# Patient Record
Sex: FEMALE | Race: OTHER | HISPANIC OR LATINO
[De-identification: names, ages, dates, MRNs, and addresses within clinical notes are randomized per-mention and may not be internally consistent; named-entity substitution may affect disease eponyms.]

---

## 2024-08-19 ENCOUNTER — NON-APPOINTMENT (OUTPATIENT)
Age: 29
End: 2024-08-19

## 2024-09-03 PROBLEM — Z00.00 ENCOUNTER FOR PREVENTIVE HEALTH EXAMINATION: Status: ACTIVE | Noted: 2024-09-03

## 2024-09-04 ENCOUNTER — APPOINTMENT (OUTPATIENT)
Dept: ANTEPARTUM | Facility: CLINIC | Age: 29
End: 2024-09-04
Payer: MEDICAID

## 2024-09-04 ENCOUNTER — ASOB RESULT (OUTPATIENT)
Age: 29
End: 2024-09-04

## 2024-09-04 PROCEDURE — 76813 OB US NUCHAL MEAS 1 GEST: CPT | Mod: 59

## 2024-09-04 PROCEDURE — 76817 TRANSVAGINAL US OBSTETRIC: CPT

## 2024-09-04 PROCEDURE — 76801 OB US < 14 WKS SINGLE FETUS: CPT

## 2024-09-18 ENCOUNTER — TRANSCRIPTION ENCOUNTER (OUTPATIENT)
Age: 29
End: 2024-09-18

## 2024-10-18 ENCOUNTER — ASOB RESULT (OUTPATIENT)
Age: 29
End: 2024-10-18

## 2024-10-18 ENCOUNTER — APPOINTMENT (OUTPATIENT)
Dept: ANTEPARTUM | Facility: CLINIC | Age: 29
End: 2024-10-18

## 2024-10-18 PROCEDURE — 76811 OB US DETAILED SNGL FETUS: CPT

## 2024-10-28 ENCOUNTER — ASOB RESULT (OUTPATIENT)
Age: 29
End: 2024-10-28

## 2024-10-28 ENCOUNTER — APPOINTMENT (OUTPATIENT)
Dept: ANTEPARTUM | Facility: CLINIC | Age: 29
End: 2024-10-28
Payer: MEDICAID

## 2024-10-28 PROCEDURE — 76816 OB US FOLLOW-UP PER FETUS: CPT

## 2024-12-16 ENCOUNTER — APPOINTMENT (OUTPATIENT)
Dept: ANTEPARTUM | Facility: CLINIC | Age: 29
End: 2024-12-16
Payer: MEDICAID

## 2024-12-16 ENCOUNTER — ASOB RESULT (OUTPATIENT)
Age: 29
End: 2024-12-16

## 2024-12-16 PROCEDURE — 76819 FETAL BIOPHYS PROFIL W/O NST: CPT | Mod: 59

## 2024-12-16 PROCEDURE — 76816 OB US FOLLOW-UP PER FETUS: CPT

## 2025-01-13 ENCOUNTER — ASOB RESULT (OUTPATIENT)
Age: 30
End: 2025-01-13

## 2025-01-13 ENCOUNTER — APPOINTMENT (OUTPATIENT)
Dept: ANTEPARTUM | Facility: CLINIC | Age: 30
End: 2025-01-13
Payer: MEDICAID

## 2025-01-13 PROCEDURE — 76816 OB US FOLLOW-UP PER FETUS: CPT

## 2025-02-14 ENCOUNTER — APPOINTMENT (OUTPATIENT)
Dept: ANTEPARTUM | Facility: CLINIC | Age: 30
End: 2025-02-14
Payer: MEDICAID

## 2025-02-14 ENCOUNTER — ASOB RESULT (OUTPATIENT)
Age: 30
End: 2025-02-14

## 2025-02-14 PROCEDURE — 76819 FETAL BIOPHYS PROFIL W/O NST: CPT | Mod: 59

## 2025-02-14 PROCEDURE — 76816 OB US FOLLOW-UP PER FETUS: CPT

## 2025-03-01 ENCOUNTER — RESULT REVIEW (OUTPATIENT)
Age: 30
End: 2025-03-01

## 2025-03-01 ENCOUNTER — OUTPATIENT (OUTPATIENT)
Dept: OUTPATIENT SERVICES | Facility: HOSPITAL | Age: 30
LOS: 1 days | End: 2025-03-01
Payer: MEDICAID

## 2025-03-01 VITALS
RESPIRATION RATE: 18 BRPM | DIASTOLIC BLOOD PRESSURE: 63 MMHG | OXYGEN SATURATION: 97 % | HEART RATE: 61 BPM | SYSTOLIC BLOOD PRESSURE: 97 MMHG | TEMPERATURE: 98 F

## 2025-03-01 DIAGNOSIS — Z98.890 OTHER SPECIFIED POSTPROCEDURAL STATES: Chronic | ICD-10-CM

## 2025-03-01 DIAGNOSIS — O26.899 OTHER SPECIFIED PREGNANCY RELATED CONDITIONS, UNSPECIFIED TRIMESTER: ICD-10-CM

## 2025-03-01 PROCEDURE — 76818 FETAL BIOPHYS PROFILE W/NST: CPT

## 2025-03-01 PROCEDURE — 99221 1ST HOSP IP/OBS SF/LOW 40: CPT

## 2025-03-01 PROCEDURE — 59025 FETAL NON-STRESS TEST: CPT

## 2025-03-01 PROCEDURE — 76818 FETAL BIOPHYS PROFILE W/NST: CPT | Mod: 26

## 2025-03-01 PROCEDURE — G0463: CPT

## 2025-03-01 NOTE — OB PROVIDER TRIAGE NOTE - HISTORY OF PRESENT ILLNESS
Patient is a 29 year old  at 39w2d who presents to triage with referral from Dr Salazar for cat II tracing and rule out oligo.  Denies vaginal bleeding, leakage of fluid, decreased fetal movement, or any other concerns.   PNC with Dr Salazar  PMHx: Hypothyroidism and Vit D deficiency  Sxhx: D&C x 1  Meds: PNV, medication for hypothyroidism (unknown at this time)  Allergies: NKDA  OBHx:  Preg 2012- First trimester eTOP with D&C  GynHx: Normal menses, one partner, no stds, no cysts, no fibroids, normal paps  SocialHx; neg x 3, no anxiety or depression

## 2025-03-01 NOTE — OB PROVIDER TRIAGE NOTE - ADDITIONAL INSTRUCTIONS
Discharge home with strict precautions  Report back to triage with vaginal bleeding, leakage of fluid, decreased fetal movement, abdominal or pelvic pain, or any other concerns  [place] reviewed  Follow up in office as scheduled on Tuesday  Increase oral hydration

## 2025-03-01 NOTE — OB PROVIDER TRIAGE NOTE - NSOBPROVIDERNOTE_OBGYN_ALL_OB_FT
29 year old  at 39w2d for prolonged monitoring. BPP 8 29 year old  at 39w2d for prolonged monitoring. BPP .  Reassuring fetal status.

## 2025-03-01 NOTE — OB RN TRIAGE NOTE - NSNURSINGINSTR_OBGYN_ALL_OB_FT
Pt discharged home per MAKAYLA Francis. Pt given discharge instructions/education. Pt given labor precautions. Pt instructed to return back to triage if experiencing vaginal bleeding, leakage of fluid, decreased fetal movement, abdominal/ pelvic pain or any other concerns. Pt educated on fetal kick counts and baby movement. Pt is to follow up in office as scheduled on Tuesday. Pt discharged home per MAKAYLA Francis. Pt given discharge instructions/education. Pt given labor precautions. Pt instructed to return back to triage if experiencing vaginal bleeding, leakage of fluid, decreased fetal movement, abdominal/ pelvic pain or any other concerns. Pt educated on fetal kick counts and baby movement. Pt is to follow up in office as scheduled on Tuesday. Pt verbalized understanding of discharge instructions/education.

## 2025-03-02 DIAGNOSIS — Z3A.39 39 WEEKS GESTATION OF PREGNANCY: ICD-10-CM

## 2025-03-02 DIAGNOSIS — E03.9 HYPOTHYROIDISM, UNSPECIFIED: ICD-10-CM

## 2025-03-02 DIAGNOSIS — O36.8330 MATERNAL CARE FOR ABNORMALITIES OF THE FETAL HEART RATE OR RHYTHM, THIRD TRIMESTER, NOT APPLICABLE OR UNSPECIFIED: ICD-10-CM

## 2025-03-02 DIAGNOSIS — O99.283 ENDOCRINE, NUTRITIONAL AND METABOLIC DISEASES COMPLICATING PREGNANCY, THIRD TRIMESTER: ICD-10-CM

## 2025-03-03 ENCOUNTER — OUTPATIENT (OUTPATIENT)
Dept: OUTPATIENT SERVICES | Facility: HOSPITAL | Age: 30
LOS: 1 days | End: 2025-03-03
Payer: MEDICAID

## 2025-03-03 DIAGNOSIS — O26.899 OTHER SPECIFIED PREGNANCY RELATED CONDITIONS, UNSPECIFIED TRIMESTER: ICD-10-CM

## 2025-03-03 DIAGNOSIS — Z98.890 OTHER SPECIFIED POSTPROCEDURAL STATES: Chronic | ICD-10-CM

## 2025-03-03 PROCEDURE — 99213 OFFICE O/P EST LOW 20 MIN: CPT | Mod: 25

## 2025-03-03 PROCEDURE — 59025 FETAL NON-STRESS TEST: CPT | Mod: 26

## 2025-03-03 PROCEDURE — G0463: CPT

## 2025-03-03 PROCEDURE — 59025 FETAL NON-STRESS TEST: CPT

## 2025-03-04 VITALS
RESPIRATION RATE: 15 BRPM | HEART RATE: 74 BPM | OXYGEN SATURATION: 99 % | SYSTOLIC BLOOD PRESSURE: 108 MMHG | DIASTOLIC BLOOD PRESSURE: 66 MMHG | TEMPERATURE: 99 F

## 2025-03-04 NOTE — OB RN TRIAGE NOTE - SUICIDE SCREENING QUESTION 1
----- Message from Sheri Monteiro DO sent at 12/13/2017 11:11 AM CST -----  Please inform patient INR now too low at 1.7.  Recommend increasing Coumadin 2.5 mg 6 days a week. Only hold one day per week instead of 2.  Please order home ACL draw for 1 week.  
ACL Lab: [phone]  Writer spoke to Emilia staff from [facility] and inform that home draw repeat in 1 week.  Emilia is aware and in agreement   Pt is scheduled on 12/20/2017   
Informed patient regarding test results and Dr. Monteiro plan of care.   Patient verbalizes understanding and has no further questions at this time.  Patient in agreement.  Order placed for home draw     
No

## 2025-03-04 NOTE — OB PROVIDER TRIAGE NOTE - ADDITIONAL INSTRUCTIONS
continue prenatal vitamins  if water breaks contractions noted vaginal bleeding noted return to delivery room  daily check the baby movements with fetal kick count  no sex nothing in vagina no heavy lifting no strenuous activities  modified activities: walk as tolerated to prevent clot formation  water hydration 2-3liters a day; avoid caffeine beverages: which make you palpitate and dehydrate  See Dr. Salazar in office today as scheduled  Return to labor room with any new or worsening symptoms

## 2025-03-04 NOTE — OB RN TRIAGE NOTE - PAIN SCALE PREFERRED, PROFILE
Your medical care was provided today by: JULIANA Pierre    Thank You for the opportunity to serve you.    You may receive a brief survey in the mail shortly regarding your visit today. Please take a few moments to complete the survey and return it; no postage is necessary. We are working to serve our patient population better, improve customer service and our patients overall experience and your input can help us to accomplish this. We thank you for your help and for the opportunity to serve you today and in the future.     Special Instructions:  Always call 9-1-1 immediately if you develop a life threatening emergency.    Unless told otherwise please take all medications as directed and complete prescription therapies.     Watch for the following signs that require additional evaluation: progressive lethargy or unresponsiveness, localized pain (chest, abdomen), shortness of breath, painful breathing, progressive vomiting with weakness, bloody stools, or new rash.     If you are prescribed pain medication or any other medication that is sedating, do not take medication before or while operating a vehicle or heavy machinery or equipment due to potential side effects such as drowsiness and/or dizziness.    numerical 0-10

## 2025-03-04 NOTE — OB PROVIDER TRIAGE NOTE - NSOBPROVIDERNOTE_OBGYN_ALL_OB_FT
30yo  pt at 39w5d c/o pelvic pressure  - unable to reach Dr. Salazar, reviewed with Dr. Paul burnett attending  - continue close fetal monitoring 28yo  pt at 39w5d c/o pelvic pressure  - unable to reach Dr. Salazar, reviewed with Dr. Miller house attending  - NST cat I, baseline 130, moderate variability, accels present, no decels  - pt stable for discharge home, has an appointment with Dr. Salazar today at 1130am  - Discussed with Dr. Miller

## 2025-03-04 NOTE — OB PROVIDER TRIAGE NOTE - HISTORY OF PRESENT ILLNESS
28yo  pt at 39w5d (LMP 24) presents c/o pelvic pressure  Pt reports she had a BPP 3 days ago and was told that if she felt any difference to come back to triage.  Pt denies ctx pain, VB, LOF  no fever/chills/headache/n/v/c/d or any other complaints at this time    prenatalhx: no problems with current pregnancy, pt of dr. beebe  obhx: , TOP with D&C 2012  gynhx: denies hx cysts, fibroids, or abnormal pap smears    medhx: denies  meds: PNVs  surghx: D&C 2012  allerg: nkda  sochx: denies cigarette, alcohol, or drug use  psychhx: denies hx depression or anxiety

## 2025-03-04 NOTE — OB PROVIDER TRIAGE NOTE - NSHPPHYSICALEXAM_GEN_ALL_CORE
Vital Signs Last 24 Hrs  T(C): 37 (04 Mar 2025 01:08), Max: 37 (04 Mar 2025 01:08)  T(F): 98.6 (04 Mar 2025 01:08), Max: 98.6 (04 Mar 2025 01:08)  HR: 74 (04 Mar 2025 01:08) (74 - 74)  BP: 108/66 (04 Mar 2025 01:08) (108/66 - 108/66)  RR: 15 (04 Mar 2025 01:08) (15 - 15)  SpO2: 99% (04 Mar 2025 01:08) (99% - 99%)    Parameters below as of 04 Mar 2025 01:08  Patient On (Oxygen Delivery Method): room air    fetal tracing cat I  baseline 130  moderate variability  accels present  no decels    physical exam:  gen: A&O x3, NAD  abd: gravid, soft, non tender, no rigidity or guarding  extrem: soft, non tender, no swelling/varicosities/or color changes noted  gyn: cervix C/L/P no VB or LOF noted

## 2025-03-05 ENCOUNTER — INPATIENT (INPATIENT)
Facility: HOSPITAL | Age: 30
LOS: 2 days | Discharge: HOME | DRG: 951 | End: 2025-03-08
Attending: OBSTETRICS & GYNECOLOGY | Admitting: OBSTETRICS & GYNECOLOGY
Payer: MEDICAID

## 2025-03-05 VITALS
HEART RATE: 81 BPM | TEMPERATURE: 98 F | SYSTOLIC BLOOD PRESSURE: 120 MMHG | RESPIRATION RATE: 18 BRPM | OXYGEN SATURATION: 99 % | DIASTOLIC BLOOD PRESSURE: 78 MMHG

## 2025-03-05 DIAGNOSIS — Z98.890 OTHER SPECIFIED POSTPROCEDURAL STATES: Chronic | ICD-10-CM

## 2025-03-05 DIAGNOSIS — Z34.80 ENCOUNTER FOR SUPERVISION OF OTHER NORMAL PREGNANCY, UNSPECIFIED TRIMESTER: ICD-10-CM

## 2025-03-05 DIAGNOSIS — Z3A.39 39 WEEKS GESTATION OF PREGNANCY: ICD-10-CM

## 2025-03-05 DIAGNOSIS — O26.899 OTHER SPECIFIED PREGNANCY RELATED CONDITIONS, UNSPECIFIED TRIMESTER: ICD-10-CM

## 2025-03-05 PROBLEM — E03.9 HYPOTHYROIDISM, UNSPECIFIED: Chronic | Status: ACTIVE | Noted: 2025-03-01

## 2025-03-05 LAB
APTT BLD: 27.7 SEC — SIGNIFICANT CHANGE UP (ref 24.5–35.6)
BASOPHILS # BLD AUTO: 0.03 K/UL — SIGNIFICANT CHANGE UP (ref 0–0.2)
BASOPHILS NFR BLD AUTO: 0.3 % — SIGNIFICANT CHANGE UP (ref 0–2)
EOSINOPHIL # BLD AUTO: 0.05 K/UL — SIGNIFICANT CHANGE UP (ref 0–0.5)
EOSINOPHIL NFR BLD AUTO: 0.5 % — SIGNIFICANT CHANGE UP (ref 0–6)
FIBRINOGEN PPP-MCNC: 524 MG/DL — HIGH (ref 200–475)
HCT VFR BLD CALC: 38 % — SIGNIFICANT CHANGE UP (ref 34.5–45)
HGB BLD-MCNC: 12.3 G/DL — SIGNIFICANT CHANGE UP (ref 11.5–15.5)
IMM GRANULOCYTES NFR BLD AUTO: 0.3 % — SIGNIFICANT CHANGE UP (ref 0–0.9)
INR BLD: 0.85 RATIO — SIGNIFICANT CHANGE UP (ref 0.85–1.16)
LYMPHOCYTES # BLD AUTO: 2.11 K/UL — SIGNIFICANT CHANGE UP (ref 1–3.3)
LYMPHOCYTES # BLD AUTO: 20.6 % — SIGNIFICANT CHANGE UP (ref 13–44)
MCHC RBC-ENTMCNC: 28.5 PG — SIGNIFICANT CHANGE UP (ref 27–34)
MCHC RBC-ENTMCNC: 32.4 G/DL — SIGNIFICANT CHANGE UP (ref 32–36)
MCV RBC AUTO: 88.2 FL — SIGNIFICANT CHANGE UP (ref 80–100)
MONOCYTES # BLD AUTO: 0.49 K/UL — SIGNIFICANT CHANGE UP (ref 0–0.9)
MONOCYTES NFR BLD AUTO: 4.8 % — SIGNIFICANT CHANGE UP (ref 2–14)
NEUTROPHILS # BLD AUTO: 7.53 K/UL — HIGH (ref 1.8–7.4)
NEUTROPHILS NFR BLD AUTO: 73.5 % — SIGNIFICANT CHANGE UP (ref 43–77)
NRBC BLD AUTO-RTO: 0 /100 WBCS — SIGNIFICANT CHANGE UP (ref 0–0)
PLATELET # BLD AUTO: 355 K/UL — SIGNIFICANT CHANGE UP (ref 150–400)
PROTHROM AB SERPL-ACNC: 9.9 SEC — SIGNIFICANT CHANGE UP (ref 9.9–13.4)
RBC # BLD: 4.31 M/UL — SIGNIFICANT CHANGE UP (ref 3.8–5.2)
RBC # FLD: 13.5 % — SIGNIFICANT CHANGE UP (ref 10.3–14.5)
WBC # BLD: 10.24 K/UL — SIGNIFICANT CHANGE UP (ref 3.8–10.5)
WBC # FLD AUTO: 10.24 K/UL — SIGNIFICANT CHANGE UP (ref 3.8–10.5)

## 2025-03-05 RX ORDER — OXYTOCIN-SODIUM CHLORIDE 0.9% IV SOLN 30 UNIT/500ML 30-0.9/5 UT/ML-%
SOLUTION INTRAVENOUS
Qty: 30 | Refills: 0 | Status: DISCONTINUED | OUTPATIENT
Start: 2025-03-05 | End: 2025-03-06

## 2025-03-05 RX ORDER — SODIUM CHLORIDE 9 G/1000ML
1000 INJECTION, SOLUTION INTRAVENOUS
Refills: 0 | Status: DISCONTINUED | OUTPATIENT
Start: 2025-03-05 | End: 2025-03-06

## 2025-03-05 RX ORDER — OXYTOCIN-SODIUM CHLORIDE 0.9% IV SOLN 30 UNIT/500ML 30-0.9/5 UT/ML-%
167 SOLUTION INTRAVENOUS
Qty: 30 | Refills: 0 | Status: DISCONTINUED | OUTPATIENT
Start: 2025-03-05 | End: 2025-03-06

## 2025-03-05 RX ADMIN — OXYTOCIN-SODIUM CHLORIDE 0.9% IV SOLN 30 UNIT/500ML 2 MILLIUNIT(S)/MIN: 30-0.9/5 SOLUTION at 23:53

## 2025-03-06 ENCOUNTER — APPOINTMENT (OUTPATIENT)
Dept: ANTEPARTUM | Facility: CLINIC | Age: 30
End: 2025-03-06

## 2025-03-06 LAB
ABO RH CONFIRMATION: SIGNIFICANT CHANGE UP
T PALLIDUM AB TITR SER: NEGATIVE — SIGNIFICANT CHANGE UP

## 2025-03-06 RX ORDER — DIPHENHYDRAMINE HCL 12.5MG/5ML
25 ELIXIR ORAL EVERY 6 HOURS
Refills: 0 | Status: DISCONTINUED | OUTPATIENT
Start: 2025-03-06 | End: 2025-03-08

## 2025-03-06 RX ORDER — WITCH HAZEL LEAF
1 FLUID EXTRACT MISCELLANEOUS EVERY 4 HOURS
Refills: 0 | Status: DISCONTINUED | OUTPATIENT
Start: 2025-03-06 | End: 2025-03-08

## 2025-03-06 RX ORDER — BENZOCAINE 220 MG/G
1 SPRAY, METERED PERIODONTAL EVERY 6 HOURS
Refills: 0 | Status: DISCONTINUED | OUTPATIENT
Start: 2025-03-06 | End: 2025-03-08

## 2025-03-06 RX ORDER — MAGNESIUM HYDROXIDE 400 MG/5ML
30 SUSPENSION ORAL
Refills: 0 | Status: DISCONTINUED | OUTPATIENT
Start: 2025-03-06 | End: 2025-03-08

## 2025-03-06 RX ORDER — OXYCODONE HYDROCHLORIDE 30 MG/1
5 TABLET ORAL ONCE
Refills: 0 | Status: DISCONTINUED | OUTPATIENT
Start: 2025-03-06 | End: 2025-03-08

## 2025-03-06 RX ORDER — CITRIC ACID/SODIUM CITRATE 300-500 MG
30 SOLUTION, ORAL ORAL ONCE
Refills: 0 | Status: DISCONTINUED | OUTPATIENT
Start: 2025-03-06 | End: 2025-03-06

## 2025-03-06 RX ORDER — PRENATAL 136/IRON/FOLIC ACID 27 MG-1 MG
1 TABLET ORAL DAILY
Refills: 0 | Status: DISCONTINUED | OUTPATIENT
Start: 2025-03-06 | End: 2025-03-08

## 2025-03-06 RX ORDER — HYDROCORTISONE 10 MG/G
1 CREAM TOPICAL EVERY 6 HOURS
Refills: 0 | Status: DISCONTINUED | OUTPATIENT
Start: 2025-03-06 | End: 2025-03-08

## 2025-03-06 RX ORDER — KETOROLAC TROMETHAMINE 30 MG/ML
30 INJECTION, SOLUTION INTRAMUSCULAR; INTRAVENOUS ONCE
Refills: 0 | Status: DISCONTINUED | OUTPATIENT
Start: 2025-03-06 | End: 2025-03-06

## 2025-03-06 RX ORDER — MODIFIED LANOLIN 100 %
1 CREAM (GRAM) TOPICAL EVERY 6 HOURS
Refills: 0 | Status: DISCONTINUED | OUTPATIENT
Start: 2025-03-06 | End: 2025-03-08

## 2025-03-06 RX ORDER — SIMETHICONE 80 MG
80 TABLET,CHEWABLE ORAL EVERY 4 HOURS
Refills: 0 | Status: DISCONTINUED | OUTPATIENT
Start: 2025-03-06 | End: 2025-03-08

## 2025-03-06 RX ORDER — DIBUCAINE 10 MG/G
1 OINTMENT TOPICAL EVERY 6 HOURS
Refills: 0 | Status: DISCONTINUED | OUTPATIENT
Start: 2025-03-06 | End: 2025-03-08

## 2025-03-06 RX ORDER — LEVOTHYROXINE SODIUM 300 MCG
75 TABLET ORAL DAILY
Refills: 0 | Status: DISCONTINUED | OUTPATIENT
Start: 2025-03-06 | End: 2025-03-08

## 2025-03-06 RX ORDER — IBUPROFEN 200 MG
600 TABLET ORAL EVERY 6 HOURS
Refills: 0 | Status: COMPLETED | OUTPATIENT
Start: 2025-03-06 | End: 2026-02-02

## 2025-03-06 RX ORDER — ACETAMINOPHEN 500 MG/5ML
975 LIQUID (ML) ORAL
Refills: 0 | Status: DISCONTINUED | OUTPATIENT
Start: 2025-03-06 | End: 2025-03-08

## 2025-03-06 RX ORDER — AZITHROMYCIN 250 MG
500 CAPSULE ORAL ONCE
Refills: 0 | Status: DISCONTINUED | OUTPATIENT
Start: 2025-03-06 | End: 2025-03-06

## 2025-03-06 RX ORDER — PRAMOXINE HCL 1 %
1 GEL (GRAM) TOPICAL EVERY 4 HOURS
Refills: 0 | Status: DISCONTINUED | OUTPATIENT
Start: 2025-03-06 | End: 2025-03-08

## 2025-03-06 RX ORDER — OXYTOCIN-SODIUM CHLORIDE 0.9% IV SOLN 30 UNIT/500ML 30-0.9/5 UT/ML-%
167 SOLUTION INTRAVENOUS
Qty: 30 | Refills: 0 | Status: DISCONTINUED | OUTPATIENT
Start: 2025-03-06 | End: 2025-03-08

## 2025-03-06 RX ORDER — CEFAZOLIN SODIUM IN 0.9 % NACL 3 G/100 ML
2000 INTRAVENOUS SOLUTION, PIGGYBACK (ML) INTRAVENOUS ONCE
Refills: 0 | Status: DISCONTINUED | OUTPATIENT
Start: 2025-03-06 | End: 2025-03-06

## 2025-03-06 RX ORDER — OXYCODONE HYDROCHLORIDE 30 MG/1
5 TABLET ORAL
Refills: 0 | Status: DISCONTINUED | OUTPATIENT
Start: 2025-03-06 | End: 2025-03-08

## 2025-03-06 RX ADMIN — OXYTOCIN-SODIUM CHLORIDE 0.9% IV SOLN 30 UNIT/500ML 2 MILLIUNIT(S)/MIN: 30-0.9/5 SOLUTION at 14:50

## 2025-03-06 RX ADMIN — KETOROLAC TROMETHAMINE 30 MILLIGRAM(S): 30 INJECTION, SOLUTION INTRAMUSCULAR; INTRAVENOUS at 21:03

## 2025-03-06 RX ADMIN — Medication 75 MICROGRAM(S): at 07:21

## 2025-03-06 RX ADMIN — KETOROLAC TROMETHAMINE 30 MILLIGRAM(S): 30 INJECTION, SOLUTION INTRAMUSCULAR; INTRAVENOUS at 21:31

## 2025-03-06 RX ADMIN — SODIUM CHLORIDE 125 MILLILITER(S): 9 INJECTION, SOLUTION INTRAVENOUS at 14:50

## 2025-03-06 RX ADMIN — Medication 975 MILLIGRAM(S): at 23:45

## 2025-03-06 RX ADMIN — Medication 3 MILLILITER(S): at 23:00

## 2025-03-06 RX ADMIN — Medication 975 MILLIGRAM(S): at 22:43

## 2025-03-06 RX ADMIN — OXYTOCIN-SODIUM CHLORIDE 0.9% IV SOLN 30 UNIT/500ML 2 MILLIUNIT(S)/MIN: 30-0.9/5 SOLUTION at 06:45

## 2025-03-07 DIAGNOSIS — R33.9 RETENTION OF URINE, UNSPECIFIED: ICD-10-CM

## 2025-03-07 DIAGNOSIS — D62 ACUTE POSTHEMORRHAGIC ANEMIA: ICD-10-CM

## 2025-03-07 LAB
BASOPHILS # BLD AUTO: 0.05 K/UL — SIGNIFICANT CHANGE UP (ref 0–0.2)
BASOPHILS NFR BLD AUTO: 0.3 % — SIGNIFICANT CHANGE UP (ref 0–2)
EOSINOPHIL # BLD AUTO: 0.06 K/UL — SIGNIFICANT CHANGE UP (ref 0–0.5)
EOSINOPHIL NFR BLD AUTO: 0.4 % — SIGNIFICANT CHANGE UP (ref 0–6)
HCT VFR BLD CALC: 25.1 % — LOW (ref 34.5–45)
HGB BLD-MCNC: 8.4 G/DL — LOW (ref 11.5–15.5)
IMM GRANULOCYTES NFR BLD AUTO: 0.6 % — SIGNIFICANT CHANGE UP (ref 0–0.9)
LYMPHOCYTES # BLD AUTO: 13.4 % — SIGNIFICANT CHANGE UP (ref 13–44)
LYMPHOCYTES # BLD AUTO: 2.19 K/UL — SIGNIFICANT CHANGE UP (ref 1–3.3)
MCHC RBC-ENTMCNC: 29.7 PG — SIGNIFICANT CHANGE UP (ref 27–34)
MCHC RBC-ENTMCNC: 33.5 G/DL — SIGNIFICANT CHANGE UP (ref 32–36)
MCV RBC AUTO: 88.7 FL — SIGNIFICANT CHANGE UP (ref 80–100)
MONOCYTES # BLD AUTO: 0.82 K/UL — SIGNIFICANT CHANGE UP (ref 0–0.9)
MONOCYTES NFR BLD AUTO: 5 % — SIGNIFICANT CHANGE UP (ref 2–14)
NEUTROPHILS # BLD AUTO: 13.13 K/UL — HIGH (ref 1.8–7.4)
NEUTROPHILS NFR BLD AUTO: 80.3 % — HIGH (ref 43–77)
NRBC BLD AUTO-RTO: 0 /100 WBCS — SIGNIFICANT CHANGE UP (ref 0–0)
PLATELET # BLD AUTO: 264 K/UL — SIGNIFICANT CHANGE UP (ref 150–400)
RBC # BLD: 2.83 M/UL — LOW (ref 3.8–5.2)
RBC # FLD: 13.7 % — SIGNIFICANT CHANGE UP (ref 10.3–14.5)
WBC # BLD: 16.35 K/UL — HIGH (ref 3.8–10.5)
WBC # FLD AUTO: 16.35 K/UL — HIGH (ref 3.8–10.5)

## 2025-03-07 RX ORDER — ONDANSETRON HCL/PF 4 MG/2 ML
4 VIAL (ML) INJECTION ONCE
Refills: 0 | Status: COMPLETED | OUTPATIENT
Start: 2025-03-07 | End: 2025-03-07

## 2025-03-07 RX ORDER — SODIUM CHLORIDE 9 G/1000ML
500 INJECTION, SOLUTION INTRAVENOUS ONCE
Refills: 0 | Status: COMPLETED | OUTPATIENT
Start: 2025-03-07 | End: 2025-03-07

## 2025-03-07 RX ADMIN — Medication 3 MILLILITER(S): at 06:51

## 2025-03-07 RX ADMIN — Medication 975 MILLIGRAM(S): at 06:11

## 2025-03-07 RX ADMIN — Medication 3 MILLILITER(S): at 14:15

## 2025-03-07 RX ADMIN — Medication 975 MILLIGRAM(S): at 22:20

## 2025-03-07 RX ADMIN — SODIUM CHLORIDE 1000 MILLILITER(S): 9 INJECTION, SOLUTION INTRAVENOUS at 04:30

## 2025-03-07 RX ADMIN — Medication 975 MILLIGRAM(S): at 07:10

## 2025-03-07 RX ADMIN — Medication 3 MILLILITER(S): at 22:19

## 2025-03-07 RX ADMIN — Medication 4 MILLIGRAM(S): at 08:23

## 2025-03-07 RX ADMIN — Medication 75 MICROGRAM(S): at 06:27

## 2025-03-07 RX ADMIN — Medication 1 TABLET(S): at 12:36

## 2025-03-07 RX ADMIN — Medication 975 MILLIGRAM(S): at 23:20

## 2025-03-08 ENCOUNTER — TRANSCRIPTION ENCOUNTER (OUTPATIENT)
Age: 30
End: 2025-03-08

## 2025-03-08 VITALS
SYSTOLIC BLOOD PRESSURE: 108 MMHG | OXYGEN SATURATION: 99 % | DIASTOLIC BLOOD PRESSURE: 58 MMHG | RESPIRATION RATE: 17 BRPM | HEART RATE: 58 BPM | TEMPERATURE: 98 F

## 2025-03-08 DIAGNOSIS — E03.9 HYPOTHYROIDISM, UNSPECIFIED: ICD-10-CM

## 2025-03-08 PROCEDURE — 85025 COMPLETE CBC W/AUTO DIFF WBC: CPT

## 2025-03-08 PROCEDURE — 59050 FETAL MONITOR W/REPORT: CPT

## 2025-03-08 PROCEDURE — 85384 FIBRINOGEN ACTIVITY: CPT

## 2025-03-08 PROCEDURE — 85610 PROTHROMBIN TIME: CPT

## 2025-03-08 PROCEDURE — 86780 TREPONEMA PALLIDUM: CPT

## 2025-03-08 PROCEDURE — 86923 COMPATIBILITY TEST ELECTRIC: CPT

## 2025-03-08 PROCEDURE — 36415 COLL VENOUS BLD VENIPUNCTURE: CPT

## 2025-03-08 PROCEDURE — 86850 RBC ANTIBODY SCREEN: CPT

## 2025-03-08 PROCEDURE — 86901 BLOOD TYPING SEROLOGIC RH(D): CPT

## 2025-03-08 PROCEDURE — 85730 THROMBOPLASTIN TIME PARTIAL: CPT

## 2025-03-08 PROCEDURE — 86900 BLOOD TYPING SEROLOGIC ABO: CPT

## 2025-03-08 RX ORDER — DOCUSATE SODIUM 100 MG
1 CAPSULE ORAL
Qty: 60 | Refills: 0
Start: 2025-03-08 | End: 2025-04-06

## 2025-03-08 RX ORDER — ACETAMINOPHEN 500 MG/5ML
2 LIQUID (ML) ORAL
Qty: 30 | Refills: 0
Start: 2025-03-08 | End: 2025-03-12

## 2025-03-08 RX ORDER — SIMETHICONE 80 MG
1 TABLET,CHEWABLE ORAL
Qty: 60 | Refills: 1
Start: 2025-03-08 | End: 2025-05-06

## 2025-03-08 RX ORDER — FERROUS SULFATE 137(45) MG
1 TABLET, EXTENDED RELEASE ORAL
Qty: 30 | Refills: 1
Start: 2025-03-08 | End: 2025-05-06

## 2025-03-08 RX ORDER — IBUPROFEN 200 MG
1 TABLET ORAL
Qty: 20 | Refills: 0
Start: 2025-03-08 | End: 2025-03-12

## 2025-03-08 RX ORDER — PRENATAL 136/IRON/FOLIC ACID 27 MG-1 MG
1 TABLET ORAL
Qty: 30 | Refills: 5
Start: 2025-03-08 | End: 2025-09-03

## 2025-03-08 RX ORDER — IBUPROFEN 200 MG
600 TABLET ORAL EVERY 6 HOURS
Refills: 0 | Status: DISCONTINUED | OUTPATIENT
Start: 2025-03-08 | End: 2025-03-08

## 2025-03-08 RX ADMIN — Medication 600 MILLIGRAM(S): at 06:50

## 2025-03-08 RX ADMIN — Medication 75 MICROGRAM(S): at 05:51

## 2025-03-08 RX ADMIN — Medication 3 MILLILITER(S): at 06:37

## 2025-03-08 RX ADMIN — Medication 600 MILLIGRAM(S): at 05:50

## 2025-03-11 DIAGNOSIS — Z3A.39 39 WEEKS GESTATION OF PREGNANCY: ICD-10-CM

## 2025-03-11 DIAGNOSIS — R10.2 PELVIC AND PERINEAL PAIN: ICD-10-CM

## 2025-03-11 DIAGNOSIS — O26.893 OTHER SPECIFIED PREGNANCY RELATED CONDITIONS, THIRD TRIMESTER: ICD-10-CM

## 2025-03-11 DIAGNOSIS — E03.9 HYPOTHYROIDISM, UNSPECIFIED: ICD-10-CM

## 2025-03-11 DIAGNOSIS — O99.283 ENDOCRINE, NUTRITIONAL AND METABOLIC DISEASES COMPLICATING PREGNANCY, THIRD TRIMESTER: ICD-10-CM
